# Patient Record
Sex: MALE | Race: ASIAN | NOT HISPANIC OR LATINO | URBAN - METROPOLITAN AREA
[De-identification: names, ages, dates, MRNs, and addresses within clinical notes are randomized per-mention and may not be internally consistent; named-entity substitution may affect disease eponyms.]

---

## 2024-04-11 VITALS
WEIGHT: 136.69 LBS | RESPIRATION RATE: 15 BRPM | DIASTOLIC BLOOD PRESSURE: 60 MMHG | HEART RATE: 72 BPM | SYSTOLIC BLOOD PRESSURE: 103 MMHG | HEIGHT: 63.39 IN | TEMPERATURE: 98 F | OXYGEN SATURATION: 97 %

## 2024-04-11 RX ORDER — CHLORHEXIDINE GLUCONATE 213 G/1000ML
1 SOLUTION TOPICAL ONCE
Refills: 0 | Status: DISCONTINUED | OUTPATIENT
Start: 2024-04-17 | End: 2024-05-01

## 2024-04-11 NOTE — H&P ADULT - NSHPLABSRESULTS_GEN_ALL_CORE
04-17    137  |  106  |  24<H>  ----------------------------<  114<H>  4.1   |  22  |  0.94    Ca    9.5      17 Apr 2024 09:43  Mg     2.7     04-17    TPro  7.9  /  Alb  4.9  /  TBili  0.5  /  DBili  x   /  AST  15  /  ALT  9<L>  /  AlkPhos  87  04-17          CARDIAC MARKERS ( 17 Apr 2024 09:43 )  x     / x     / 119 U/L / x     / 1.3 ng/mL        Urinalysis Basic - ( 17 Apr 2024 09:43 )    Color: x / Appearance: x / SG: x / pH: x  Gluc: 114 mg/dL / Ketone: x  / Bili: x / Urobili: x   Blood: x / Protein: x / Nitrite: x   Leuk Esterase: x / RBC: x / WBC x   Sq Epi: x / Non Sq Epi: x / Bacteria: x        EKG: sinus, HR 67, aVL low voltage, no ischemic changes

## 2024-04-11 NOTE — H&P ADULT - HISTORY OF PRESENT ILLNESS
Cardiologist - Dr. Miles Corea  Pharmacy -  Escort -    44M, Mandarin speaking, w/ PMHx of congenital coronary-pulm artery fistula, initially presented to outpt cardiologist c/o mild SOB w/ mod exertion x 3 months, that improves w/ rest. He denies any ___ CP, palpitations, dizziness, syncope, diaphoresis, fatigue, LE edema, orthopnea, PND, N/V, fever, chills or recent sick contact.     CCTA 1/13/23: Ca score 0. Coronary artery to pulm artery fistula involving a single large tortuous fistulous tract from pLAD coursing anteriorly to anterior base of main PA. Small ventricular septal crypt/cleft in mid ventricular septum w/o L-R shunt.  TTE 1/16/24: LVEF 66%, mild MR, trace TR.    In light of pts risk factors, CCS class __ anginal symptoms and coronary-pulm fistula, pt now presents to [facility] for recommended cardiac catheterization with possible fistula coil. Cardiologist - Dr. Miles Corea  Pharmacy - [facility] [address], [place]  Escort - Wife Franny    44M w/ PMHx of congenital coronary-pulm artery fistula, initially presented to outpt cardiologist c/o mild SOB w/ mod exertion and at times at rest x 3 months. Patient reports occasional abdominal pain 2/2 gastritis. He denies any CP, palpitations, dizziness, syncope, diaphoresis, fatigue, LE edema, orthopnea, PND, hematuria, hematochezia, melena, N/V, fever, chills, cough, congestion, or recent sick contact.     CCTA 1/13/23: Ca score 0. Coronary artery to pulm artery fistula involving a single large tortuous fistulous tract from pLAD coursing anteriorly to anterior base of main PA. Small ventricular septal crypt/cleft in mid ventricular septum w/o L-R shunt.  TTE 1/16/24: LVEF 66%, mild MR, trace TR.    In light of pts risk factors, CCS class IV anginal symptoms and coronary-pulm fistula, pt now presents to [facility] for recommended cardiac catheterization with possible fistula coil.

## 2024-04-11 NOTE — H&P ADULT - ASSESSMENT
44M, Mandarin and English speaking, w/ PMHx of congenital coronary-pulm artery fistula, initially presented to outpt cardiologist c/o SOB, now presents to [facility] for diagnostic cardiac catheterization prior to coil insertion in light of pts risk factors, CCS class IV anginal symptoms and coronary-pulm fistula.    -ASA II, Mallampati III  -VSS  -Patient is a candidate for conscious sedation  -IVF: 250 bolus followed by 75 cc/hour for 2 hours  -DAPT: Patient is not compliant with ASA, will load with 325 mg  -CBC clotted prior to cath however labs from 4/12/24 H/H 14.8/43.3, plt 163. CBC to be redrawn    Risks & benefits of procedure and alternative therapy have been explained to the patient including but not limited to: allergic reaction, bleeding w/possible need for blood transfusion, infection, renal and vascular compromise, limb damage, arrhythmia, stroke, vessel dissection/perforation, Myocardial infarction, emergent CABG. Informed consent obtained and in chart.

## 2024-04-17 ENCOUNTER — OUTPATIENT (OUTPATIENT)
Dept: OUTPATIENT SERVICES | Facility: HOSPITAL | Age: 44
LOS: 1 days | Discharge: HOME | End: 2024-04-17
Payer: COMMERCIAL

## 2024-04-17 LAB
ALBUMIN SERPL ELPH-MCNC: 4.9 G/DL — SIGNIFICANT CHANGE UP (ref 3.3–5)
ALP SERPL-CCNC: 87 U/L — SIGNIFICANT CHANGE UP (ref 40–120)
ALT FLD-CCNC: 9 U/L — LOW (ref 10–45)
ANION GAP SERPL CALC-SCNC: 9 MMOL/L — SIGNIFICANT CHANGE UP (ref 5–17)
AST SERPL-CCNC: 15 U/L — SIGNIFICANT CHANGE UP (ref 10–40)
BILIRUB SERPL-MCNC: 0.5 MG/DL — SIGNIFICANT CHANGE UP (ref 0.2–1.2)
BUN SERPL-MCNC: 24 MG/DL — HIGH (ref 7–23)
CALCIUM SERPL-MCNC: 9.5 MG/DL — SIGNIFICANT CHANGE UP (ref 8.4–10.5)
CHLORIDE SERPL-SCNC: 106 MMOL/L — SIGNIFICANT CHANGE UP (ref 96–108)
CHOLEST SERPL-MCNC: 167 MG/DL — SIGNIFICANT CHANGE UP
CK MB CFR SERPL CALC: 1.3 NG/ML — SIGNIFICANT CHANGE UP (ref 0–6.7)
CK SERPL-CCNC: 119 U/L — SIGNIFICANT CHANGE UP (ref 30–200)
CO2 SERPL-SCNC: 22 MMOL/L — SIGNIFICANT CHANGE UP (ref 22–31)
CREAT SERPL-MCNC: 0.94 MG/DL — SIGNIFICANT CHANGE UP (ref 0.5–1.3)
EGFR: 101 ML/MIN/1.73M2 — SIGNIFICANT CHANGE UP
GLUCOSE SERPL-MCNC: 114 MG/DL — HIGH (ref 70–99)
HDLC SERPL-MCNC: 36 MG/DL — LOW
LIPID PNL WITH DIRECT LDL SERPL: 113 MG/DL — HIGH
MAGNESIUM SERPL-MCNC: 2.7 MG/DL — HIGH (ref 1.6–2.6)
NON HDL CHOLESTEROL: 131 MG/DL — HIGH
POTASSIUM SERPL-MCNC: 4.1 MMOL/L — SIGNIFICANT CHANGE UP (ref 3.5–5.3)
POTASSIUM SERPL-SCNC: 4.1 MMOL/L — SIGNIFICANT CHANGE UP (ref 3.5–5.3)
PROT SERPL-MCNC: 7.9 G/DL — SIGNIFICANT CHANGE UP (ref 6–8.3)
SODIUM SERPL-SCNC: 137 MMOL/L — SIGNIFICANT CHANGE UP (ref 135–145)
TRIGL SERPL-MCNC: 95 MG/DL — SIGNIFICANT CHANGE UP

## 2024-04-17 PROCEDURE — 99152 MOD SED SAME PHYS/QHP 5/>YRS: CPT

## 2024-04-17 PROCEDURE — 93458 L HRT ARTERY/VENTRICLE ANGIO: CPT

## 2024-04-17 PROCEDURE — C1769: CPT

## 2024-04-17 PROCEDURE — 80053 COMPREHEN METABOLIC PANEL: CPT

## 2024-04-17 PROCEDURE — C1894: CPT

## 2024-04-17 PROCEDURE — 93458 L HRT ARTERY/VENTRICLE ANGIO: CPT | Mod: 26

## 2024-04-17 PROCEDURE — 82553 CREATINE MB FRACTION: CPT

## 2024-04-17 PROCEDURE — 80061 LIPID PANEL: CPT

## 2024-04-17 PROCEDURE — 83735 ASSAY OF MAGNESIUM: CPT

## 2024-04-17 PROCEDURE — 82550 ASSAY OF CK (CPK): CPT

## 2024-04-17 PROCEDURE — C1887: CPT

## 2024-04-17 RX ORDER — PANTOPRAZOLE SODIUM 20 MG/1
1 TABLET, DELAYED RELEASE ORAL
Refills: 0 | DISCHARGE

## 2024-04-17 RX ORDER — SUCRALFATE 1 G
1 TABLET ORAL
Refills: 0 | DISCHARGE

## 2024-04-17 RX ORDER — SODIUM CHLORIDE 9 MG/ML
1000 INJECTION INTRAMUSCULAR; INTRAVENOUS; SUBCUTANEOUS
Refills: 0 | Status: DISCONTINUED | OUTPATIENT
Start: 2024-04-17 | End: 2024-04-17

## 2024-04-17 RX ORDER — SODIUM CHLORIDE 9 MG/ML
250 INJECTION INTRAMUSCULAR; INTRAVENOUS; SUBCUTANEOUS ONCE
Refills: 0 | Status: COMPLETED | OUTPATIENT
Start: 2024-04-17 | End: 2024-04-17

## 2024-04-17 RX ORDER — ASPIRIN/CALCIUM CARB/MAGNESIUM 324 MG
325 TABLET ORAL ONCE
Refills: 0 | Status: COMPLETED | OUTPATIENT
Start: 2024-04-17 | End: 2024-04-17

## 2024-04-17 RX ORDER — ASPIRIN/CALCIUM CARB/MAGNESIUM 324 MG
1 TABLET ORAL
Refills: 0 | DISCHARGE

## 2024-04-17 RX ORDER — SODIUM CHLORIDE 9 MG/ML
1000 INJECTION INTRAMUSCULAR; INTRAVENOUS; SUBCUTANEOUS
Refills: 0 | Status: DISCONTINUED | OUTPATIENT
Start: 2024-04-17 | End: 2024-05-01

## 2024-04-17 RX ORDER — FAMOTIDINE 10 MG/ML
1 INJECTION INTRAVENOUS
Refills: 0 | DISCHARGE

## 2024-04-17 RX ADMIN — Medication 325 MILLIGRAM(S): at 11:43

## 2024-04-17 RX ADMIN — SODIUM CHLORIDE 75 MILLILITER(S): 9 INJECTION INTRAMUSCULAR; INTRAVENOUS; SUBCUTANEOUS at 11:38

## 2024-04-17 RX ADMIN — SODIUM CHLORIDE 500 MILLILITER(S): 9 INJECTION INTRAMUSCULAR; INTRAVENOUS; SUBCUTANEOUS at 11:38

## 2024-04-17 NOTE — PROGRESS NOTE ADULT - SUBJECTIVE AND OBJECTIVE BOX
Interventional Cardiology  Post  Diagnostic Catheterization  Discharge Note      Patient without complaints. Ambulated and voided without difficulties    Afebrile, VSS    Ext:    	  		Right Radial: no hematoma, bleeding, dressing; C/D/I      Pulses:    intact R RAD to baseline     A/P: 44M w/ PMHx of congenital coronary-pulm artery fistula s/p [facility] 4/17 that was diagnostic revealing LM normal, LAD normal, large fistula from LAD to PA, very tortous  LCx large normal, codominant. RCA large, codominant, oRCA-PA small fistula. EDP 9mmHg. R TR removed without complications.    1. Follow-up with PMD/Cardiologist Dr. Corea  in 1week.   2. Pt given instructions on importance of post radial access site care.     3. Stable for discharge as per attending Dr. Mercado after bed rest, pt voids, wrist stable and 30 minutes of ambulation.  4. Patient will follow up outpatient to get cardiac MRI outpatient with plan for eventual fistula repair.

## 2024-04-22 DIAGNOSIS — R94.39 ABNORMAL RESULT OF OTHER CARDIOVASCULAR FUNCTION STUDY: ICD-10-CM

## 2024-04-22 DIAGNOSIS — I25.10 ATHEROSCLEROTIC HEART DISEASE OF NATIVE CORONARY ARTERY WITHOUT ANGINA PECTORIS: ICD-10-CM
